# Patient Record
(demographics unavailable — no encounter records)

---

## 2025-05-15 NOTE — HISTORY OF PRESENT ILLNESS
[de-identified] : 19-year-old RHD female presents for evaluation of left shoulder pain x 10 days. She fell down 5-6 steps. She went to the ER the same day and had x-rays which we have for review. She complains of intermittent sharp pain at the posterior shoulder worse with lifting and ROM. Patient also reports pain when sneezing. She has been taking Ibuprofen and applying topical patches with some relief. Denies prior injuries. Patient works and is a student.   The patient's past medical history, past surgical history, medications and allergies were reviewed by me today with the patient and documented accordingly. In addition, the patient's family and social history, which were noncontributory to this visit were reviewed also.

## 2025-05-15 NOTE — PHYSICAL EXAM
[de-identified] : Left shoulder exam  Inspection: No malalignment, No defects, No atrophy Skin: No masses, No lesions Neck: Negative Spurling's, full ROM, no pain with ROM AROM: FF to 180, abduction to 90, ER to 60, IR to upper lumbar Painful arc ROM: ER, IR Tenderness: +pain over the inferior scapula. +pain over the posterior rib. no bicipital tenderness, no tenderness to the greater tuberosity/RTC insertion, no anterior shoulder/lesser tuberosity tenderness Strength: 5/5 ER with pain, 5/5 IR in adduction, 5/5 supraspinatus testing, negative Hemingway's test AC Joint: No ttp/pain with cross arm testing Biceps: Speed Negative, Yergusons Negative Impingement test: Negative Hurley, Negative Neer  Stability: Stable Vasc: 2+ radial pulse Neuro: AIN, PIN, Ulnar nerve intact to motor Sensation: Intact to light touch throughout  [de-identified] : The following radiographs were ordered and read by me during this patients visit. I reviewed each radiograph in detail with the patient and discussed the findings as highlighted below.   3 views of the left shoulder were obtained, 05/04/2025, that show no acute fracture or dislocation. There is no glenohumeral and no AC joint degenerative change seen. Type I acromion. There is no significant malalignment. No significant other obvious osseous abnormality, otherwise unremarkable.

## 2025-05-15 NOTE — DISCUSSION/SUMMARY
[de-identified] : 20 y/o female with left shoulder pain.   Patient presents with left scapula and posterior rib pain following a fall 2 weeks ago. Given her continues pain and clinical presentation I discussed my concern for possible fracture to the shoulder blade and/or rib. I explained that treatment for this would include relative rest, possible immobilization, ice, NSAIDs and possible PT. Discussed the use of CT scan at this time to rule out a fracture.   Recommendations: CT scan RX given. Conservative care as discussed.  Follow-up after CT scan

## 2025-05-15 NOTE — ADDENDUM
[FreeTextEntry1] : This note was written by Collette Neff on 05/15/2025 acting solely as a scribe for Dr. Junito Weiner.   All medical record entries made by the Scribe were at my, Dr. Junito Weiner, direction and personally dictated by me on 05/15/2025. I have personally reviewed the chart and agree that the record accurately reflects my personal performance of the history, physical exam, assessment and plan.